# Patient Record
Sex: FEMALE | Race: WHITE | NOT HISPANIC OR LATINO | Employment: UNEMPLOYED | URBAN - METROPOLITAN AREA
[De-identification: names, ages, dates, MRNs, and addresses within clinical notes are randomized per-mention and may not be internally consistent; named-entity substitution may affect disease eponyms.]

---

## 2025-04-16 ENCOUNTER — APPOINTMENT (OUTPATIENT)
Dept: OBSTETRICS AND GYNECOLOGY | Facility: CLINIC | Age: 30
End: 2025-04-16
Payer: COMMERCIAL

## 2025-06-24 ENCOUNTER — HOSPITAL ENCOUNTER (EMERGENCY)
Facility: HOSPITAL | Age: 30
Discharge: HOME | End: 2025-06-24
Payer: COMMERCIAL

## 2025-06-24 ENCOUNTER — OFFICE VISIT (OUTPATIENT)
Dept: URGENT CARE | Age: 30
End: 2025-06-24
Payer: COMMERCIAL

## 2025-06-24 ENCOUNTER — APPOINTMENT (OUTPATIENT)
Dept: CARDIOLOGY | Facility: HOSPITAL | Age: 30
End: 2025-06-24
Payer: COMMERCIAL

## 2025-06-24 VITALS
OXYGEN SATURATION: 99 % | DIASTOLIC BLOOD PRESSURE: 72 MMHG | TEMPERATURE: 98.4 F | HEART RATE: 92 BPM | HEIGHT: 64 IN | SYSTOLIC BLOOD PRESSURE: 107 MMHG | RESPIRATION RATE: 17 BRPM | WEIGHT: 137.3 LBS | BODY MASS INDEX: 23.44 KG/M2

## 2025-06-24 VITALS
SYSTOLIC BLOOD PRESSURE: 110 MMHG | OXYGEN SATURATION: 98 % | RESPIRATION RATE: 16 BRPM | DIASTOLIC BLOOD PRESSURE: 72 MMHG | WEIGHT: 135 LBS | HEART RATE: 91 BPM | TEMPERATURE: 98 F | BODY MASS INDEX: 23.91 KG/M2

## 2025-06-24 DIAGNOSIS — L03.90 CELLULITIS, UNSPECIFIED CELLULITIS SITE: Primary | ICD-10-CM

## 2025-06-24 DIAGNOSIS — M79.10 MYALGIA: ICD-10-CM

## 2025-06-24 DIAGNOSIS — A69.20 ERYTHEMA MIGRANS (LYME DISEASE): Primary | ICD-10-CM

## 2025-06-24 LAB
ALBUMIN SERPL BCP-MCNC: 4.1 G/DL (ref 3.4–5)
ALP SERPL-CCNC: 67 U/L (ref 33–110)
ALT SERPL W P-5'-P-CCNC: 33 U/L (ref 7–45)
ANION GAP SERPL CALCULATED.3IONS-SCNC: 11 MMOL/L (ref 10–20)
AST SERPL W P-5'-P-CCNC: 28 U/L (ref 9–39)
ATRIAL RATE: 97 BPM
BASOPHILS # BLD AUTO: 0.02 X10*3/UL (ref 0–0.1)
BASOPHILS NFR BLD AUTO: 0.4 %
BILIRUB SERPL-MCNC: 0.4 MG/DL (ref 0–1.2)
BUN SERPL-MCNC: 7 MG/DL (ref 6–23)
CALCIUM SERPL-MCNC: 8.8 MG/DL (ref 8.6–10.3)
CHLORIDE SERPL-SCNC: 102 MMOL/L (ref 98–107)
CO2 SERPL-SCNC: 25 MMOL/L (ref 21–32)
CREAT SERPL-MCNC: 0.67 MG/DL (ref 0.5–1.05)
EGFRCR SERPLBLD CKD-EPI 2021: >90 ML/MIN/1.73M*2
EOSINOPHIL # BLD AUTO: 0 X10*3/UL (ref 0–0.7)
EOSINOPHIL NFR BLD AUTO: 0 %
ERYTHROCYTE [DISTWIDTH] IN BLOOD BY AUTOMATED COUNT: 13.3 % (ref 11.5–14.5)
GLUCOSE SERPL-MCNC: 118 MG/DL (ref 74–99)
HCG UR QL IA.RAPID: NEGATIVE
HCT VFR BLD AUTO: 32.5 % (ref 36–46)
HGB BLD-MCNC: 11.1 G/DL (ref 12–16)
IMM GRANULOCYTES # BLD AUTO: 0.02 X10*3/UL (ref 0–0.7)
IMM GRANULOCYTES NFR BLD AUTO: 0.4 % (ref 0–0.9)
LYMPHOCYTES # BLD AUTO: 0.51 X10*3/UL (ref 1.2–4.8)
LYMPHOCYTES NFR BLD AUTO: 10.6 %
MCH RBC QN AUTO: 30.4 PG (ref 26–34)
MCHC RBC AUTO-ENTMCNC: 34.2 G/DL (ref 32–36)
MCV RBC AUTO: 89 FL (ref 80–100)
MONOCYTES # BLD AUTO: 0.25 X10*3/UL (ref 0.1–1)
MONOCYTES NFR BLD AUTO: 5.2 %
NEUTROPHILS # BLD AUTO: 4.03 X10*3/UL (ref 1.2–7.7)
NEUTROPHILS NFR BLD AUTO: 83.4 %
NRBC BLD-RTO: 0 /100 WBCS (ref 0–0)
P AXIS: 78 DEGREES
P OFFSET: 197 MS
P ONSET: 153 MS
PLATELET # BLD AUTO: 197 X10*3/UL (ref 150–450)
POTASSIUM SERPL-SCNC: 3.7 MMOL/L (ref 3.5–5.3)
PR INTERVAL: 132 MS
PROT SERPL-MCNC: 7.2 G/DL (ref 6.4–8.2)
Q ONSET: 219 MS
QRS COUNT: 16 BEATS
QRS DURATION: 92 MS
QT INTERVAL: 374 MS
QTC CALCULATION(BAZETT): 474 MS
QTC FREDERICIA: 438 MS
R AXIS: 120 DEGREES
RBC # BLD AUTO: 3.65 X10*6/UL (ref 4–5.2)
SODIUM SERPL-SCNC: 134 MMOL/L (ref 136–145)
T AXIS: 60 DEGREES
T OFFSET: 406 MS
VENTRICULAR RATE: 97 BPM
WBC # BLD AUTO: 4.8 X10*3/UL (ref 4.4–11.3)

## 2025-06-24 PROCEDURE — 85025 COMPLETE CBC W/AUTO DIFF WBC: CPT | Performed by: PHYSICIAN ASSISTANT

## 2025-06-24 PROCEDURE — 86618 LYME DISEASE ANTIBODY: CPT | Performed by: PHYSICIAN ASSISTANT

## 2025-06-24 PROCEDURE — 93005 ELECTROCARDIOGRAM TRACING: CPT

## 2025-06-24 PROCEDURE — 84075 ASSAY ALKALINE PHOSPHATASE: CPT | Performed by: PHYSICIAN ASSISTANT

## 2025-06-24 PROCEDURE — 81025 URINE PREGNANCY TEST: CPT | Performed by: PHYSICIAN ASSISTANT

## 2025-06-24 PROCEDURE — 2500000001 HC RX 250 WO HCPCS SELF ADMINISTERED DRUGS (ALT 637 FOR MEDICARE OP): Performed by: PHYSICIAN ASSISTANT

## 2025-06-24 PROCEDURE — 36415 COLL VENOUS BLD VENIPUNCTURE: CPT | Performed by: PHYSICIAN ASSISTANT

## 2025-06-24 PROCEDURE — 86617 LYME DISEASE ANTIBODY: CPT | Performed by: PHYSICIAN ASSISTANT

## 2025-06-24 PROCEDURE — 99284 EMERGENCY DEPT VISIT MOD MDM: CPT

## 2025-06-24 RX ORDER — CITALOPRAM 20 MG/1
20 TABLET ORAL DAILY
COMMUNITY

## 2025-06-24 RX ORDER — DOXYCYCLINE 100 MG/1
100 CAPSULE ORAL ONCE
Status: COMPLETED | OUTPATIENT
Start: 2025-06-24 | End: 2025-06-24

## 2025-06-24 RX ORDER — DOXYCYCLINE 100 MG/1
100 CAPSULE ORAL 2 TIMES DAILY
Qty: 56 CAPSULE | Refills: 0 | Status: SHIPPED | OUTPATIENT
Start: 2025-06-24 | End: 2025-07-22

## 2025-06-24 RX ADMIN — DOXYCYCLINE HYCLATE 100 MG: 100 CAPSULE ORAL at 12:15

## 2025-06-24 ASSESSMENT — ENCOUNTER SYMPTOMS
FEVER: 1
COLOR CHANGE: 1

## 2025-06-24 ASSESSMENT — PAIN - FUNCTIONAL ASSESSMENT: PAIN_FUNCTIONAL_ASSESSMENT: 0-10

## 2025-06-24 ASSESSMENT — PAIN SCALES - GENERAL: PAINLEVEL_OUTOF10: 0 - NO PAIN

## 2025-06-24 NOTE — PROGRESS NOTES
Subjective   Patient ID: Abril Huston is a 30 y.o. female. They present today with a chief complaint of Insect Bite (For 3 days).    History of Present Illness  HPI    Past Medical History  Allergies as of 06/24/2025 - Reviewed 06/24/2025   Allergen Reaction Noted    Amoxicillin Other 06/24/2025       Prescriptions Prior to Admission[1]     Medical History[2]    Surgical History[3]         Review of Systems  Review of Systems   Constitutional:  Positive for fever.   Skin:  Positive for color change.   All other systems reviewed and are negative.                                 Objective    Vitals:    06/24/25 1045   BP: 110/72   BP Location: Left arm   Patient Position: Sitting   BP Cuff Size: Adult   Pulse: 91   Resp: 16   Temp: 36.7 °C (98 °F)   TempSrc: Temporal   SpO2: 98%   Weight: 61.2 kg (135 lb)     No LMP recorded.    Physical Exam  Vitals reviewed.   HENT:      Head: Normocephalic.   Cardiovascular:      Rate and Rhythm: Normal rate and regular rhythm.      Pulses: Normal pulses.      Heart sounds: Normal heart sounds.   Pulmonary:      Effort: Pulmonary effort is normal.      Breath sounds: Normal breath sounds.   Abdominal:      General: Abdomen is flat. Bowel sounds are normal.      Palpations: Abdomen is soft.   Musculoskeletal:      Cervical back: Normal range of motion.   Skin:     Capillary Refill: Capillary refill takes less than 2 seconds.      Coloration: Skin is pale.      Findings: Erythema present.   Neurological:      General: No focal deficit present.      Mental Status: She is alert and oriented to person, place, and time.   Psychiatric:         Mood and Affect: Mood normal.         Behavior: Behavior normal.         Procedures    Point of Care Test & Imaging Results from this visit  No results found for this visit on 06/24/25.   Imaging  No results found.    Cardiology, Vascular, and Other Imaging  No other imaging results found for the past 2 days      Diagnostic study results (if any) were  reviewed by VERONIQUE Sotomayor.    Assessment/Plan   Allergies, medications, history, and pertinent labs/EKGs/Imaging reviewed by VERONIQUE Sotomayor.     Medical Decision Making  30-year-old female appears in no acute distress although pale.  She reports that she does not know what happened if she got bit by something she noticed on her left abdomen hip area erythema.  She also has had a fever since Sunday and not felt well fever and chills fever was 102.  She reports the redness is getting bigger.  On exam patient is nontoxic-appearing although pale vital signs are stable.  She is referred to the emergency room due to cellulitis and now fever and chills.  She may need lab work and IV antibiotics.  Patient agrees with plan of care patient left in stable condition.    Orders and Diagnoses  There are no diagnoses linked to this encounter.    Medical Admin Record      Patient disposition: ED    Electronically signed by VERONIQUE Sotomayor  10:48 AM           [1] (Not in a hospital admission)  [2]   Past Medical History:  Diagnosis Date    Candidiasis, unspecified 07/10/2020    Yeast infection    Twin pregnancy, unspecified number of placenta and unspecified number of amniotic sacs, unspecified trimester ([facility]) 11/05/2018    Twin pregnancy    Unspecified symptoms and signs involving the genitourinary system 06/10/2019    UTI symptoms   [3] No past surgical history on file.

## 2025-06-24 NOTE — ED PROVIDER NOTES
HPI   Chief Complaint   Patient presents with    Insect Bite     Presents to ed with a c/c of insect bite. Reports feeling feverish and having body aches on Sunday. Had noticed a pos insect bite on left hip yesterday morning.  A febrile at this time, has taken tylenol today.  Wound is very red with 2 red lumps, mo drainage present        HPI  This is a 30-year-old female presenting to the emergency department for evaluation of rash on her left hip, fever, chills and myalgia.  Symptoms started 2 days ago.  Yesterday she noticed redness to this area on her left hip.  She states she has been having bodyaches, fevers and overall not feeling well.  She denies any headaches, visual changes, numbness or tingling in her arms or legs, confusion, difficulty completing tasks, chest pain or shortness of breath.    Please see HPI for pertinent positive and negative ROS.       Patient History   Medical History[1]  Surgical History[2]  Family History[3]  Social History[4]    Physical Exam   ED Triage Vitals [06/24/25 1114]   Temperature Heart Rate Respirations BP   36.9 °C (98.4 °F) 92 17 107/72      Pulse Ox Temp Source Heart Rate Source Patient Position   99 % Oral Monitor --      BP Location FiO2 (%)     -- --       Physical Exam  GENERAL APPEARANCE: Awake and alert. No acute respiratory distress.   VITAL SIGNS: As per the nurses' triage record.  HEENT: Normocephalic, atraumatic.   NECK: Soft, nontender and supple  CHEST: Nontender to palpation. Clear to auscultation bilaterally. Symmetric rise and fall of chest wall.   HEART: Clear S1 and S2. Regular rate and rhythm. Strong and equal pulses in the extremities.  ABDOMEN: Soft, nontender, nondistended  MUSCULOSKELETAL: Full gross active range of motion. Ambulating on own with no acute difficulties  NEUROLOGICAL: Awake, alert and oriented x 3. Motor power intact in the upper and lower extremities. Sensation is intact to light touch in the upper and lower extremities. Patient  answering questions appropriately.   IMMUNOLOGICAL: No lymphatic streaking noted  DERMATOLOGIC: Warm and dry.  Erythema migraines present on the left lateral hip.  No tick present.  PYSCH: Cooperative with appropriate mood and affect.    ED Course & MDM   Diagnoses as of 06/24/25 1724   Erythema migrans (Lyme disease)   Myalgia                 No data recorded     [place] Coma Scale Score: 15 (06/24/25 1132 : Elvia Bailey RN)                           Medical Decision Making  Parts of this chart have been completed using voice recognition software. Please excuse any errors of transcription.  My thought process and reason for plan has been formulated from the time that I saw the patient until the time of disposition and is not specific to one specific moment during their visit and furthermore my MDM encompasses this entire chart and not only this text box.      HPI: Detailed above.    Exam: A medically appropriate exam performed, outlined above, given the known history and presentation.    History obtained from: Patient    EKG: Seen by supervising physicians EKG interpretation    Medications given during visit:  Medications   doxycycline (Vibramycin) capsule 100 mg (100 mg oral Given 6/24/25 1215)        Diagnostic/tests  Labs Reviewed   CBC WITH AUTO DIFFERENTIAL - Abnormal       Result Value    WBC 4.8      nRBC 0.0      RBC 3.65 (*)     Hemoglobin 11.1 (*)     Hematocrit 32.5 (*)     MCV 89      MCH 30.4      MCHC 34.2      RDW 13.3      Platelets 197      Neutrophils % 83.4      Immature Granulocytes %, Automated 0.4      Lymphocytes % 10.6      Monocytes % 5.2      Eosinophils % 0.0      Basophils % 0.4      Neutrophils Absolute 4.03      Immature Granulocytes Absolute, Automated 0.02      Lymphocytes Absolute 0.51 (*)     Monocytes Absolute 0.25      Eosinophils Absolute 0.00      Basophils Absolute 0.02     COMPREHENSIVE METABOLIC PANEL - Abnormal    Glucose 118 (*)     Sodium 134 (*)     Potassium 3.7       Chloride 102      Bicarbonate 25      Anion Gap 11      Urea Nitrogen 7      Creatinine 0.67      eGFR >90      Calcium 8.8      Albumin 4.1      Alkaline Phosphatase 67      Total Protein 7.2      AST 28      Bilirubin, Total 0.4      ALT 33     HCG, URINE, QUALITATIVE - Normal    HCG, Urine NEGATIVE     LYME (B. BURGDORFERI) AB MODIFIED 2-TITER TESTING, WITH REFLEX TO IGM AND IGG BY JIM      No orders to display        Considerations/further MDM:    Differential diagnosis includes is not limited to Lyme disease arthritis versus erythema migrans.  Physical exam findings are incredibly consistent with EM rash.  There is no active tick.  I do suspect the patient's subjective fever, chills and myalgias secondary to possible Lyme disease.  CBC and CMP unremarkable, negative pregnancy test.  I did order Lyme titers.  Patient was given oral doxycycline and discharged with a 28-day course of doxycycline due to concerns of Lyme disease with arthritis secondary to the myalgias she is complaining of.  I did educate patient she needs to follow-up with her primary care physician for results of Lyme titers.  She was educated on the importance of medication compliance.  Strict return precautions were discussed with the patient and she verbalized understanding and felt comfortable with discharge plan home.  She was discharged in stable condition.    Procedure  Procedures         [1]   Past Medical History:  Diagnosis Date    Candidiasis, unspecified 07/10/2020    Yeast infection    Twin pregnancy, unspecified number of placenta and unspecified number of amniotic sacs, unspecified trimester ([facility]) 11/05/2018    Twin pregnancy    Unspecified symptoms and signs involving the genitourinary system 06/10/2019    UTI symptoms   [2] No past surgical history on file.  [3] No family history on file.  [4]   Social History  Tobacco Use    Smoking status: Not on file    Smokeless tobacco: Not on file   Substance Use Topics    Alcohol  use: Not on file    Drug use: Not on file        Brunilda Tan PA-C  06/24/25 6038

## 2025-06-24 NOTE — DISCHARGE INSTRUCTIONS
Please take doxycycline 1 tablet twice a day for 28 days.  Please make sure you are taking doxycycline after you eat with a full glass of water.  The doxycycline will make your skin more sensitive to the sun/sensitive to sunburns so please be sure you are wearing sunscreen/protecting your skin when you are outside.  I am worried you have Lyme disease.  That is why I am treating you with a full 28-day course of doxycycline.  It is important that you take the full course of the antibiotic.  I did draw Lyme titer labs to confirm suspected Lyme disease.  It is important that you follow-up with your primary care physician for these titer results.  If you begin to have worsening symptoms despite starting doxycycline or if you begin to have chest pain, confusion, headaches, changes to your vision, numbness or weakness in your arms or your legs return to the emergency department for reevaluation.

## 2025-06-30 LAB
B BURGDOR IGG SERPL QL IA: 0.09 IV
B BURGDOR IGG+IGM SER IA-IMP: POSITIVE
B BURGDOR IGM SERPL QL IA: 1.34 IV
B BURGDOR.VLSE1+PEPC10 AB SER IA-ACNC: 1.07 IV

## 2025-07-02 ENCOUNTER — DOCUMENTATION (OUTPATIENT)
Dept: OBSTETRICS AND GYNECOLOGY | Facility: CLINIC | Age: 30
End: 2025-07-02
Payer: COMMERCIAL

## 2025-07-02 ENCOUNTER — TELEPHONE (OUTPATIENT)
Dept: OBSTETRICS AND GYNECOLOGY | Facility: CLINIC | Age: 30
End: 2025-07-02
Payer: COMMERCIAL

## 2025-07-02 NOTE — TELEPHONE ENCOUNTER
TC from pt.  Was in ER for tick bite.   Unable to access her blood work results that tested for lyme's disease.   Reviewed + IGM Ab test for Lyme Disease.   Given rx doxycycline 100 mg in ER. Recommend continuing that for 28 days.